# Patient Record
Sex: FEMALE | Race: WHITE | NOT HISPANIC OR LATINO
[De-identification: names, ages, dates, MRNs, and addresses within clinical notes are randomized per-mention and may not be internally consistent; named-entity substitution may affect disease eponyms.]

---
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
## 2024-03-18 ENCOUNTER — LABORATORY RESULT (OUTPATIENT)
Age: 19
End: 2024-03-18

## 2024-03-18 ENCOUNTER — APPOINTMENT (OUTPATIENT)
Dept: RHEUMATOLOGY | Facility: CLINIC | Age: 19
End: 2024-03-18
Payer: MEDICAID

## 2024-03-18 VITALS
DIASTOLIC BLOOD PRESSURE: 73 MMHG | WEIGHT: 160 LBS | OXYGEN SATURATION: 96 % | HEIGHT: 68 IN | SYSTOLIC BLOOD PRESSURE: 114 MMHG | HEART RATE: 82 BPM | BODY MASS INDEX: 24.25 KG/M2 | TEMPERATURE: 98.6 F

## 2024-03-18 DIAGNOSIS — I73.00 RAYNAUD'S SYNDROME W/OUT GANGRENE: ICD-10-CM

## 2024-03-18 DIAGNOSIS — R23.3 SPONTANEOUS ECCHYMOSES: ICD-10-CM

## 2024-03-18 DIAGNOSIS — M25.50 PAIN IN UNSPECIFIED JOINT: ICD-10-CM

## 2024-03-18 PROCEDURE — 99204 OFFICE O/P NEW MOD 45 MIN: CPT

## 2024-03-18 RX ORDER — AMLODIPINE BESYLATE 2.5 MG/1
2.5 TABLET ORAL DAILY
Qty: 30 | Refills: 2 | Status: ACTIVE | COMMUNITY
Start: 2024-03-18 | End: 1900-01-01

## 2024-03-18 NOTE — HISTORY OF PRESENT ILLNESS
[FreeTextEntry1] : 19 y/o female referred to rheumatology for joint pains. Pt follows with hematology for iron deficiency anemia and receiving infusions. Pt reports easy bruising. Heme reportedly did not see lab results showing any underlying disease that can cause bruising. Pt reports b/l hands and wrists pains with cracking. Pains described as sharp that moves up and down the hands. Denies joint swelling, redness, warmth. Pt reports potentially has Raynaud's, changes color to purple with pain in the cold temperature. Denies digital tip ulcers. Reports fatigue. Denies rashes, oral ulcers, hair loss, sicca symptoms. Denies family Hx of autoimmune diseases. Pt is a .

## 2024-03-18 NOTE — PHYSICAL EXAM
[TextEntry] : GENERAL: Appears in no acute distress  HEENT: EOMI, PERRLA. No conjunctival erythema. Moist mucous membranes. No nasopharyngeal ulcers  NECK: Supple, no cervical lymphadenopathy, no thyromegaly  CARDIOVASCULAR: RRR. S1, S2 auscultated. No murmurs or rubs.  PULMONARY: Clear to auscultation b/l, no wheezes, rales, or crackles  ABDOMINAL: Soft, nontender, nondistended. Bowel sounds present. No organomegaly.  MSK:  No active synovitis, swelling, erythema, or warmth.  No Bouchards or Heberdens nodules.  No deformities.  Normal ROM of neck, back, b/l upper and lower extremities.  SKIN: Bilateral fingers erythema with blanching on palpation, cold to touch. Rare dilated nailfold capillaries on capillaroscopy. NEURO: No focal deficits.PSYCH: AAOx3. Normal affect and thought process.

## 2024-03-20 LAB
ALDOLASE SERPL-CCNC: 2.8 U/L
CENTROMERE IGG SER-ACNC: <0.2 AL
DSDNA AB SER-ACNC: <12 IU/ML
ENA JO1 AB SER IA-ACNC: <0.2 AL
ENA RNP AB SER IA-ACNC: 0.2 AL
ENA SCL70 IGG SER IA-ACNC: <0.2 AL
ENA SM AB SER IA-ACNC: <0.2 AL
ENA SS-A AB SER IA-ACNC: 0.4 AL
ENA SS-B AB SER IA-ACNC: <0.2 AL

## 2024-03-21 LAB
C3 SERPL-MCNC: 125 MG/DL
C4 SERPL-MCNC: 21 MG/DL

## 2024-03-22 LAB — CRYOGLOB SERPL-MCNC: NEGATIVE

## 2024-05-17 ENCOUNTER — NON-APPOINTMENT (OUTPATIENT)
Age: 19
End: 2024-05-17

## 2024-07-27 ENCOUNTER — RX RENEWAL (OUTPATIENT)
Age: 19
End: 2024-07-27

## 2024-09-19 ENCOUNTER — APPOINTMENT (OUTPATIENT)
Dept: RHEUMATOLOGY | Facility: CLINIC | Age: 19
End: 2024-09-19